# Patient Record
Sex: MALE | Race: WHITE | NOT HISPANIC OR LATINO | Employment: UNEMPLOYED | URBAN - METROPOLITAN AREA
[De-identification: names, ages, dates, MRNs, and addresses within clinical notes are randomized per-mention and may not be internally consistent; named-entity substitution may affect disease eponyms.]

---

## 2023-02-08 ENCOUNTER — TELEPHONE (OUTPATIENT)
Dept: BEHAVIORAL HEALTH | Facility: CLINIC | Age: 18
End: 2023-02-08
Payer: COMMERCIAL

## 2023-02-08 ENCOUNTER — TELEPHONE (OUTPATIENT)
Dept: PSYCHIATRY | Facility: CLINIC | Age: 18
End: 2023-02-08
Payer: COMMERCIAL

## 2023-02-08 NOTE — TELEPHONE ENCOUNTER
Spoke w/ mother , explained that we do not see kids 18 and over. She understood. And will call other resources. ----- Message from Mariela Velásquez sent at 2/8/2023  9:43 AM CST -----  Contact: MotherLori Miller  Type:  Sooner Appointment Request    Caller is requesting a sooner appointment.  Caller declined first available appointment listed below.  Caller will not accept being placed on the waitlist and is requesting a message be sent to doctor.    Name of Caller:  Rosmery Miller  When is the first available appointment?  na  Best Call Back Number:  [phone]  Additional Information:  Mom would like a call back to verify that son can be seen until the age of 20 as apt and would like a sooner NP appt. Please call mom back to schedule. Thanks!

## 2023-02-28 ENCOUNTER — TELEPHONE (OUTPATIENT)
Dept: PSYCHIATRY | Facility: CLINIC | Age: 18
End: 2023-02-28
Payer: COMMERCIAL

## 2023-02-28 NOTE — TELEPHONE ENCOUNTER
----- Message from Charley Bridges sent at 2/28/2023 10:45 AM CST -----  Contact: [phone]    Caller: Mom-    Reason: She is requesting a call back from the nurse to get assistance with getting a copy of the     patient diagnosis records and screening results.     Comments: Please call mom back to advise.

## 2023-04-19 ENCOUNTER — TELEPHONE (OUTPATIENT)
Dept: NEUROLOGY | Facility: CLINIC | Age: 18
End: 2023-04-19
Payer: MEDICAID

## 2023-04-19 NOTE — TELEPHONE ENCOUNTER
----- Message from Kareem Carter sent at 4/19/2023 10:32 AM CDT -----  Contact: Mother/Angela  Type: Needs Medical Advice  Who Called:  Mother/Angela  Best Call Back Number: [phone]   Additional Information: States pt had change in behaviors and anxiety is why they started fluoxitene

## 2023-04-19 NOTE — TELEPHONE ENCOUNTER
Spoke with patients mother to discuss reason for appointment with Dr. Browne on 5/29. Patients mother reports she has noticed a decline in patients cognitive abilities since the summer. Patient having trouble getting words out. Patient has told his mother that he know what he wants to say but is unable to get the words out and know if he wants to give a thumbs up or thumbs down but is unable to do it. Discussed with Dr. Browne and advised patients mother that Dr. Browne can work him up for any neurological cause but he should see PCP to rule out other causes. Explained per Dr. Browne there can be many other causes that could cause this in a developmentally delayed person. Advised they can also order the MRI and EEG to rule out a neurological cause and so patient doesn't have to wait until 5/29 and delay treatment. Patients mother voiced understanding and stated she will keep appointment with Dr. Browne and cancel if everything else is taken care of prior to appointment

## 2023-05-23 ENCOUNTER — TELEPHONE (OUTPATIENT)
Dept: SPEECH THERAPY | Facility: HOSPITAL | Age: 18
End: 2023-05-23
Payer: COMMERCIAL

## 2023-05-23 NOTE — TELEPHONE ENCOUNTER
Returning pt mother call, call could not be completed at this time. If she calls back she will need [phone] number to contact Cesar velazquez.    ----- Message from Tash Nielsen sent at 5/23/2023  8:07 AM CDT -----  Regarding: appt; new pt  Contact: Angela (mom) @ [phone]  Angela (mom) called on behalf of the pt to speak with someone in the office in regards to scheduling an appt and to get more information. Pt is Autistic and Angela states that he's speaking less and less. She is concerned and would like him to see someone in the Speech department. Pt is already scheduled with Neurology. Please contact Angela for scheduling. Thanks.

## 2023-06-02 ENCOUNTER — TELEPHONE (OUTPATIENT)
Dept: PSYCHIATRY | Facility: CLINIC | Age: 18
End: 2023-06-02
Payer: COMMERCIAL

## 2023-06-02 NOTE — TELEPHONE ENCOUNTER
----- Message from Kiara Browne sent at 6/1/2023  9:59 AM CDT -----  Contact: Mom [phone]  Would like to receive medical advice.    Would they like a call back or a response via [facility]:  call back    Additional information:  Calling to speak with the nurse regarding updating an evaluation for [facility] therapy. Mom is aware the center may not see pt 17 years of age.

## 2023-06-15 ENCOUNTER — TELEPHONE (OUTPATIENT)
Dept: PSYCHIATRY | Facility: CLINIC | Age: 18
End: 2023-06-15
Payer: COMMERCIAL

## 2023-06-15 NOTE — TELEPHONE ENCOUNTER
Mother LVM earlier about autism testing. Returned mom's call to inform that we require a referral in order to set up autism evaluation. Mom verbalized understanding and stated she will work on the referral.

## 2023-06-15 NOTE — TELEPHONE ENCOUNTER
----- Message from Alanna Jung sent at 6/14/2023  4:22 PM CDT -----  Contact: Sara forrester [phone]  1MEDICALADVICE     Patient is calling for Medical Advice regarding:    How long has patient had these symptoms:    Pharmacy name and phone#:    Would like response via [facility]:call back    Comments: Mom is requesting a call back from the nurse to see if pt can be seen for an autism screening at his age

## 2023-06-21 ENCOUNTER — TELEPHONE (OUTPATIENT)
Dept: PSYCHIATRY | Facility: CLINIC | Age: 18
End: 2023-06-21
Payer: COMMERCIAL

## 2023-06-28 ENCOUNTER — TELEPHONE (OUTPATIENT)
Dept: PSYCHIATRY | Facility: CLINIC | Age: 18
End: 2023-06-28
Payer: COMMERCIAL

## 2023-06-28 ENCOUNTER — PATIENT MESSAGE (OUTPATIENT)
Dept: PSYCHIATRY | Facility: CLINIC | Age: 18
End: 2023-06-28
Payer: COMMERCIAL

## 2023-06-29 ENCOUNTER — TELEPHONE (OUTPATIENT)
Dept: PSYCHIATRY | Facility: CLINIC | Age: 18
End: 2023-06-29
Payer: COMMERCIAL

## 2023-07-07 ENCOUNTER — TELEPHONE (OUTPATIENT)
Dept: PSYCHIATRY | Facility: CLINIC | Age: 18
End: 2023-07-07
Payer: COMMERCIAL

## 2023-08-15 ENCOUNTER — OFFICE VISIT (OUTPATIENT)
Dept: PSYCHIATRY | Facility: CLINIC | Age: 18
End: 2023-08-15
Payer: COMMERCIAL

## 2023-08-15 DIAGNOSIS — F84.0 AUTISM SPECTRUM DISORDER REQUIRING SUBSTANTIAL SUPPORT (LEVEL 2): Primary | ICD-10-CM

## 2023-08-15 PROCEDURE — 96138 PSYCL/NRPSYC TECH 1ST: CPT | Mod: S$GLB,,, | Performed by: PSYCHOLOGIST

## 2023-08-15 PROCEDURE — 99999 PR PBB SHADOW E&M-EST. PATIENT-LVL I: CPT | Mod: PBBFAC,,, | Performed by: PSYCHOLOGIST

## 2023-08-15 PROCEDURE — 96131 PR PSYCHOLOGIC TEST EVAL SVCS, EA ADDTL HR: ICD-10-PCS | Mod: S$GLB,,, | Performed by: PSYCHOLOGIST

## 2023-08-15 PROCEDURE — 96138 PR PSYCH/NEUROPSYCH TEST ADMIN/SCORING, BY TECH, 2+ TESTS, 1ST 30 MIN: ICD-10-PCS | Mod: S$GLB,,, | Performed by: PSYCHOLOGIST

## 2023-08-15 PROCEDURE — 1159F MED LIST DOCD IN RCRD: CPT | Mod: CPTII,S$GLB,, | Performed by: PSYCHOLOGIST

## 2023-08-15 PROCEDURE — 90791 PR PSYCHIATRIC DIAGNOSTIC EVALUATION: ICD-10-PCS | Mod: S$GLB,,, | Performed by: PSYCHOLOGIST

## 2023-08-15 PROCEDURE — 1159F PR MEDICATION LIST DOCUMENTED IN MEDICAL RECORD: ICD-10-PCS | Mod: CPTII,S$GLB,, | Performed by: PSYCHOLOGIST

## 2023-08-15 PROCEDURE — 96130 PR PSYCHOLOGIC TEST EVAL SVCS, 1ST HR: ICD-10-PCS | Mod: S$GLB,,, | Performed by: PSYCHOLOGIST

## 2023-08-15 PROCEDURE — 96131 PSYCL TST EVAL PHYS/QHP EA: CPT | Mod: S$GLB,,, | Performed by: PSYCHOLOGIST

## 2023-08-15 PROCEDURE — 96130 PSYCL TST EVAL PHYS/QHP 1ST: CPT | Mod: S$GLB,,, | Performed by: PSYCHOLOGIST

## 2023-08-15 PROCEDURE — 96139 PSYCL/NRPSYC TST TECH EA: CPT | Mod: S$GLB,,, | Performed by: PSYCHOLOGIST

## 2023-08-15 PROCEDURE — 99999 PR PBB SHADOW E&M-EST. PATIENT-LVL I: ICD-10-PCS | Mod: PBBFAC,,, | Performed by: PSYCHOLOGIST

## 2023-08-15 PROCEDURE — 96139 PR PSYCH/NEUROPSYCH TEST ADMIN/SCORING, BY TECH, 2+ TESTS, EA ADDTL 30 MIN: ICD-10-PCS | Mod: S$GLB,,, | Performed by: PSYCHOLOGIST

## 2023-08-15 PROCEDURE — 90791 PSYCH DIAGNOSTIC EVALUATION: CPT | Mod: S$GLB,,, | Performed by: PSYCHOLOGIST

## 2023-08-21 ENCOUNTER — TELEPHONE (OUTPATIENT)
Dept: PSYCHIATRY | Facility: CLINIC | Age: 18
End: 2023-08-21
Payer: COMMERCIAL

## 2023-08-21 NOTE — PSYCH TESTING
Outpatient Psychological Consultation    Name: German Hills  MRN: [id number]  : 2005    Testing appointments: 08/10/2023 and 08/15/2023    ID:  Patient presents for consultation for diagnostic clarity in regard to difficulties with social interaction.    Reason for encounter Referral from Eugene Parks MD    Psychiatric records were reviewed prior to the diagnostic interview. Comprehensive psychological assessment will not be documented in this report. Rather, this report will be focused on the referral question. See prior notes for comprehensive psychiatric work-up.    Chief Complaint: Pt is a 18 year old male presenting as a referral from Eugene Parks MD for diagnostic clarification due to report of suspected symptoms consistent with autism spectrum disorder.    Psychological Assessments Administered    Social Responsiveness Scale, Second Edition (SRS-2) - Adult (Self-Report) Form  Social Responsiveness Scale, Second Edition (SRS-2) - Adult (Relative/Other Report) Form    EVALUATION PROCEDURES AND TIMES    Conducted by Psychologist (2 hours):  Integration of patient data, interpretation of standardized test results and clinical data, clinical decision-making, treatment planning and report, and interactive feedback to the patient  CPT Codes:  70235 - 1 hour; 19285 - 1 hour  Conducted by Technician (1.5 hours):  Psychological test administration and scoring by technician, two or more tests, any method: Social Responsiveness Scale, Second Edition (SRS-2) - Adult (Self-Report) Form, Social Responsiveness Scale, Second Edition (SRS-2) - Adult (Relative/Other Report) Form  CPT Codes:  03756 - 30 minutes; 38400 - 30 minutes, 49914 - 30 minutes    Results  The SRS-2 identifies the presence and severity of social impairment within the autism spectrum and differentiates it from that which occurs in other disorders. In addition to a total score reflecting severity of social deficits in the autism spectrum, five  treatment subscale scores are provided: Social Awareness, Social Cognition, Social Communication, Social Motivation, and Restricted Interests and Repetitive Behavior. Two subscales (bolded above) are considered DSM-5 compatible. The scores below are standardized scores, and significant scores are indicated by an asterisk (*).     (SRS-2) - Adult (Self-Report) Form   Total Score: 60*    Social Awareness: 60*   Social Cognition: 58   Social Communication: 61*   Social Motivation: 61*   Restricted Interests and Repetitive Behavior: 53     (SRS-2) - Adult (Relative/Other Report) Form  Total Score: 81*    Social Awareness: 72*   Social Cognition: 76*   Social Communication: 83*   Social Motivation: 71*   Restricted Interests and Repetitive Behavior: 83*     59 or below - Within normal limits  60-65 - Mild range  66-75 - Moderate range  76 and higher - Severe range    Interpretation of SRS-2 Results    Social Awareness.  The pt reports mild difficulty with noticing social cues. Pt's mother reports that pt demonstrates moderate difficulty with this skillset.     Social Cognition.  Pt reports no difficulty interpreting social cues once they are noticed. Mother reports that pt demonstrates severe difficulty with this skillset.    Social Communication.  Pt reports mild difficulty with social communication. Mother reports that pt demonstrates severe difficulty with this skillset.    Social Motivation.  In addition, pt reports mildly low motivation to engage in social-interpersonal behavior, which may include elements of social anxiety, inhibition, and low empathic orientation. Mother reports that pt demonstrates moderately low motivation in this area.    Restricted Interests and Repetitive Behavior.  Finally, pt reports no stereotypical behaviors (i.e., stereotypy; repeated movements and routines) and highly restricted interests. Mother reports that pt demonstrates severely restricted interests and repetitive  behavior.      Summary    Mr. Hills is a 18-year-old male presenting as a referral from Eugene Parks MD for diagnostic clarification due to report of suspected symptoms consistent with Autism Spectrum Disorder (ASD). Pt reports concerns consistent with ASD. Mr. Hills's mother explained that ASD symptoms have been present since childhood and occur across several environments. Testing indicated that pt has significant difficulty with social awareness, social cognition, social communication, social motivation, and restricted interests and repetitive behavior. These results are consistent with Mr. Hills's reports, mother's reports, and the undersigned's observations during the interview. In the interview, Mr. Hills responded minimally to questions and any answers provided required numerous physical and verbal prompts from mother. Even with numerous prompts, Mr. Hills rarely responded to questions. When he did answer questions, his responses were delayed by at least one minute.     Reports by Mr. Hills's mother and observations by evaluator indicated deficits in reciprocal social communication and interaction, including:   deficits in the skills needed to approach, respond to, and interact with others effectively. For example, Mr. Hills was unable to engage in any reciprocal communication or answer questions without numerous prompts.  difficulties in nonverbal communication. Ms. Hills reported that he cannot read other people's emotions unless he sees actual tears.  deficits in developing, maintaining, and understanding relationships. Mr. Hills's mother shared that his social interactions with peers are always initiated and supervised by adult caretakers like herself.     In the interview, pt also reported and demonstrated restricted, repetitive patterns of behavior, interests, and activities, including:   stereotyped/repetitive movements. For example, Mr. Hills was observed shaking his hands in  "the air and clapping his knees together. His mother reported that he always paces if standing as well.  insistence on sameness (adherence to routine). Mr. Hills's mother reported that he has significant difficulty changing plans or having a routine interrupted, which sometimes causes dysregulation and outbursts.  fixated interests; and sensory hyperreactivity. Mr. Hills's mother stated that he has gotten overheated easily since he was a baby. She also reported that he is agitated by minute sounds and can become dysregulated by the sound of alarms. She added that uncomfortable clothes or shirt tags can cause dysregulation.    Details of the difficulties outlined above can be seen in the interview notes in "Notes" section of this note. Based on these results, pt meets criteria for autism spectrum disorder.    Diagnosis   Autism spectrum disorder, level 2    Plan and Recommendations    Advocate for accommodations in workplace or at school if needed. Consider applying for a waiver through the [place] [employer] (https://ldh.la.gov/page/8137). See below for more recommendations and two online resources.     Improve relationships and communication.  Although communication and understanding social interactions may be challenging with ASD, you can absolutely develop healthy relationships. Look for common ground with others you are talking to. Consider disclosing your diagnosis to people you trust. When you're experiencing sensory overload, it can be difficult to stay with a group conversation. Minimize distractions, change locations, or leave a large group for a one-on-one conversation. Also, you may find it difficult to know how people are feeling at times. You might find it helpful to ask them how they feel about what you are discussing.  Routines can provide reassurance and comfort but can often limit social interaction with others. Sometimes, the best way to meet people is by learning new skills or " visiting new places. Gradually introduce these changes by identifying one new place to go every week (local shop) or one new skill to learn every few months. You can also connect with other adults who have ASD by joining an online or local support group. Explore this link to find helpful ways of connecting with others, as well as other important information about living with autism as an adult: https://iancommunity.org/cs/adults.     Manage anxiety and depression.  Social situations or change in routines might bring on feelings of anxiety. Social isolation might bring on feelings of depression. Maintain your mental health by staying active. Pick a type of exercise you enjoy, which can help manage complex emotion states, including anger. Reach out to close friends and family. Socialization can be good for your mood; and receiving feedback from trusted others can help you solve social difficulties.   Learn relaxation techniques like diaphragm breathing and progressive muscle relaxation to help you manage anxiety. Practice good sleep hygiene to reduce irritability and improve your ability to regulate your emotions.    Stay organized.  Some adults with ASD are exceptionally organized, while others' fixation on specific interests can cause disorganization in other areas of life. Improving your organization skills can help you improve executive functioning (planning, time management, self-monitoring, self-control). Set timers when engaging in a hobby that drains a lot of your time, so you have time to complete important tasks. Use a task list, planner, or calendar to keep track of important events, appointments, and responsibilities. Automate some aspects of your life (e.g., automatic payments) to reduce clutter and cognitive load.     Know your strengths and challenges.  See the table below and talk to a loved one about what characteristics are relevant to you. Find ways to maximize and capitalize on your strengths and  ways to problem-solve some of your challenges. The table was adapted from the Adult Autism Toolkit: https://www.autismspeaks.org/sites/default/files/2018-08/Adult%20Tool%20Kit.pdf    STRENGTHS CHALLENGES   Attention to detail Grasping the big picture   Often highly skilled in a particular area Uneven set of skills   Deep study resulting in encyclopedic knowledge on areas of interest Difficulty developing motivation to study areas of non-interest   Tendency to be logical (helpful in decision-making where emotions may interfere) Difficulty perceiving emotional state of others   Less concern for what others may think of  them (can be a strength and a challenge),  also known as independent thinking. Often  results in novel big picture insights due to  different ways of looking at things, ideas  and concepts. Difficulty perceiving unwritten rules of social  interaction, but can learn these rules through  direct instruction and social narratives.   Usually visual processing (thinking in  pictures or video) Difficulty processing in non-favorite modalities such as aural, kinesthetic, etc.   Often very verbal (propensity for giving  detailed descriptions may be useful in  providing directions to lost persons) Difficulty parsing out and summarizing  important information for a conversation   Direct communication Sensory integration problems where input  may register unevenly, distorted and difficulty  in screening out background noise   Loyalty Generalization of skills and concepts   Honesty Difficulty in expressing empathy in ways that  others expect or understand   Nonjudgmental listening Executive functioning resulting in difficulties  planning long-term tasks
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
## 2023-08-21 NOTE — PATIENT INSTRUCTIONS
Ms. Hills - It was a pleasure meeting you and German last week. The evaluation is complete; and as we discussed, German meets criteria for autism spectrum disorder. We will fax the report to Dr. Parks, and you can access the report on your own through our Medical Records department. See below for some recommendations, and let me know if you need anything else from me. Take care!      Diagnosis   Autism spectrum disorder, level 2    Plan and Recommendations    Advocate for accommodations in workplace or at school if needed. Consider applying for a waiver through the [place] [employer] (https://ldh.la.gov/page/9090). See below for more recommendations and two online resources.     Improve relationships and communication.  Although communication and understanding social interactions may be challenging with ASD, you can absolutely develop healthy relationships. Look for common ground with others you are talking to. Consider disclosing your diagnosis to people you trust. When you're experiencing sensory overload, it can be difficult to stay with a group conversation. Minimize distractions, change locations, or leave a large group for a one-on-one conversation. Also, you may find it difficult to know how people are feeling at times. You might find it helpful to ask them how they feel about what you are discussing.  Routines can provide reassurance and comfort but can often limit social interaction with others. Sometimes, the best way to meet people is by learning new skills or visiting new places. Gradually introduce these changes by identifying one new place to go every week (local shop) or one new skill to learn every few months. You can also connect with other adults who have ASD by joining an online or local support group. Explore this link to find helpful ways of connecting with others, as well as other important information about living with autism as an adult: https://iancommunity.org/cs/adults.      Manage anxiety and depression.  Social situations or change in routines might bring on feelings of anxiety. Social isolation might bring on feelings of depression. Maintain your mental health by staying active. Pick a type of exercise you enjoy, which can help manage complex emotion states, including anger. Reach out to close friends and family. Socialization can be good for your mood; and receiving feedback from trusted others can help you solve social difficulties.   Learn relaxation techniques like diaphragm breathing and progressive muscle relaxation to help you manage anxiety. Practice good sleep hygiene to reduce irritability and improve your ability to regulate your emotions. If these tips aren't working for you, consider joining individual therapy or group therapy.  Stay organized.  Some adults with ASD are exceptionally organized, while others' fixation on specific interests can cause disorganization in other areas of life. Improving your organization skills can help you improve executive functioning (planning, time management, self-monitoring, self-control). Set timers when engaging in a hobby that drains a lot of your time, so you have time to complete important tasks. Use a task list, planner, or calendar to keep track of important events, appointments, and responsibilities. Automate some aspects of your life (e.g., automatic payments) to reduce clutter and cognitive load.   Know your strengths and challenges.  See the table below and talk to a loved one about what characteristics are relevant to you. Find ways to maximize and capitalize on your strengths and ways to problem-solve some of your challenges. The table was adapted from the Adult Autism Toolkit: https://www.autismspeaks.org/sites/default/files/2018-08/Adult%20Tool%20Kit.pdf    STRENGTHS CHALLENGES   Attention to detail Grasping the big picture   Often highly skilled in a particular area Uneven set of skills   Deep study resulting in  encyclopedic knowledge on areas of interest Difficulty developing motivation to study areas of non-interest   Tendency to be logical (helpful in decision-making where emotions may interfere) Difficulty perceiving emotional state of others   Less concern for what others may think of  them (can be a strength and a challenge),  also known as independent thinking. Often  results in novel big picture insights due to  different ways of looking at things, ideas  and concepts. Difficulty perceiving unwritten rules of social  interaction, but can learn these rules through  direct instruction and social narratives.   Usually visual processing (thinking in  pictures or video) Difficulty processing in non-favorite modalities such as aural, kinesthetic, etc.   Often very verbal (propensity for giving  detailed descriptions may be useful in  providing directions to lost persons) Difficulty parsing out and summarizing  important information for a conversation   Direct communication Sensory integration problems where input  may register unevenly, distorted and difficulty  in screening out background noise   Loyalty Generalization of skills and concepts   Honesty Difficulty in expressing empathy in ways that  others expect or understand   Nonjudgmental listening Executive functioning resulting in difficulties  planning long-term tasks

## 2024-07-24 NOTE — TELEPHONE ENCOUNTER
Patient mother [id number] at 10:37am today requesting NP appt for Therapy. I returned call. She reported that patient is verbal however due to recent event in his life the patient is withdrawn and refuses to talk much.  and advised her that we have therapists here however due to the fact that patient is not willing to talk much, it is likely he may not get much of a benefit from therapy. Mom understands. She is still interested in trying to move forward with scheduling so I advised her that we need an internal referral to schedule. Also advised her of wait list. Patient will reach out once she is able to get a referral. No further questions or concerns at this time.    141

## 2025-05-30 ENCOUNTER — PATIENT MESSAGE (OUTPATIENT)
Dept: PSYCHIATRY | Facility: CLINIC | Age: 20
End: 2025-05-30
Payer: COMMERCIAL